# Patient Record
(demographics unavailable — no encounter records)

---

## 2025-04-02 NOTE — PLAN
[FreeTextEntry1] : #Chest Pain #Exertional Dyspnea -Unclear based on Hx of symptoms more  by allergic symptoms vs primary lung vs cardiac  -EKG with no ischemic changes Plan -Due to duration of symptoms will obtain TTE to assess for any post viral myocarditis  -Dimer ordered to evaluate for PE risk  -Ordered TSH to evaluate for thyroid causes  -Will re-fill Fluticasone since in the past it helped with congestion   #Nasal Pain and congestion -Possibly eustachian tube dysfunction and allergies  -Rx Fluticasone -Has f/u with ENT Scheduled   #HCM -Mammogram Rx given -Ob gyn for Pap Smear  -GI for screening colonoscopy  -CBC, CMP, A1C and Lipid ordered -TDAP given -Enrolled in [facility]  Patient seen with Dr. Alonso  RTC in 5 weeks

## 2025-04-02 NOTE — HEALTH RISK ASSESSMENT
[Good] : ~his/her~  mood as  good [Intercurrent ED visits] : went to ED [No] : No [1 or 2 (0 pts)] : 1 or 2 (0 points) [Never (0 pts)] : Never (0 points) [No falls in past year] : Patient reported no falls in the past year [0] : 2) Feeling down, depressed, or hopeless: Not at all (0) [PHQ-2 Negative - No further assessment needed] : PHQ-2 Negative - No further assessment needed [With Family] : lives with family [Employed] : employed [] :  [# Of Children ___] : has [unfilled] children [Fully functional (bathing, dressing, toileting, transferring, walking, feeding)] : Fully functional (bathing, dressing, toileting, transferring, walking, feeding) [Fully functional (using the telephone, shopping, preparing meals, housekeeping, doing laundry, using] : Fully functional and needs no help or supervision to perform IADLs (using the telephone, shopping, preparing meals, housekeeping, doing laundry, using transportation, managing medications and managing finances) [Never] : Never [Audit-CScore] : 0 [CBO6Znlij] : 0 [MammogramDate] : Never [PapSmearDate] : Never [BoneDensityDate] : Never [FreeTextEntry2] :

## 2025-04-02 NOTE — INTERPRETER SERVICES
[Language Line ] : provided by [employer]   [Time Spent: ____ minutes] : Total time spent using  services: [unfilled] minutes. The patient's primary language is not English thus required  services. [Interpreters_IDNumber] : [id number] [Interpreters_FullName] : Oracio [TWNoteComboBox1] : Qatari

## 2025-04-02 NOTE — REVIEW OF SYSTEMS
[Cough] : cough [Dyspnea on Exertion] : dyspnea on exertion [Chest Pain] : no chest pain [Palpitations] : no palpitations [Shortness Of Breath] : no shortness of breath [Wheezing] : no wheezing [Headache] : no headache [Dizziness] : no dizziness

## 2025-04-02 NOTE — HISTORY OF PRESENT ILLNESS
[FreeTextEntry1] : NPA Establish Care CPE [de-identified] : Patient is a 45 y/o female presenting for a New patient evaluation. Patient is dealing with URI symptoms. She has been dealing with these symptoms for around 2 months. She is having exertional dyspnea, and chest pain with exertion. Also having nasal congestion.  Also having a cough. Her symptoms started as cold symptoms then ear pain and then fluid draining from her ear.

## 2025-04-02 NOTE — ASSESSMENT
[Vaccines Reviewed] : Immunizations reviewed today. Please see immunization details in the vaccine log within the immunization flowsheet.

## 2025-05-05 NOTE — REASON FOR VISIT
[Initial Consultation] : an initial consultation for [FreeTextEntry2] : referred by ER to follow up for right ear infection and dizziness the symptoms began 4 months ago

## 2025-05-05 NOTE — REVIEW OF SYSTEMS
[Dizziness] : dizziness [Recurrent Ear Infections] : recurrent ear infections [Negative] : Heme/Lymph [Patient Intake Form Reviewed] : Patient intake form was reviewed